# Patient Record
Sex: FEMALE | Race: ASIAN | NOT HISPANIC OR LATINO | URBAN - METROPOLITAN AREA
[De-identification: names, ages, dates, MRNs, and addresses within clinical notes are randomized per-mention and may not be internally consistent; named-entity substitution may affect disease eponyms.]

---

## 2018-08-26 ENCOUNTER — EMERGENCY (EMERGENCY)
Facility: HOSPITAL | Age: 53
LOS: 1 days | Discharge: HOME | End: 2018-08-26
Attending: EMERGENCY MEDICINE | Admitting: EMERGENCY MEDICINE
Payer: MEDICAID

## 2018-08-26 VITALS
OXYGEN SATURATION: 100 % | HEART RATE: 84 BPM | TEMPERATURE: 99 F | DIASTOLIC BLOOD PRESSURE: 64 MMHG | RESPIRATION RATE: 18 BRPM | SYSTOLIC BLOOD PRESSURE: 136 MMHG

## 2018-08-26 LAB
ALBUMIN SERPL ELPH-MCNC: 4.1 G/DL — SIGNIFICANT CHANGE UP (ref 3.3–5)
ALP SERPL-CCNC: 95 U/L — SIGNIFICANT CHANGE UP (ref 40–120)
ALT FLD-CCNC: 10 U/L — SIGNIFICANT CHANGE UP (ref 4–33)
ANISOCYTOSIS BLD QL: SLIGHT — SIGNIFICANT CHANGE UP
AST SERPL-CCNC: 16 U/L — SIGNIFICANT CHANGE UP (ref 4–32)
BASOPHILS # BLD AUTO: 0.01 K/UL — SIGNIFICANT CHANGE UP (ref 0–0.2)
BASOPHILS NFR BLD AUTO: 0.2 % — SIGNIFICANT CHANGE UP (ref 0–2)
BILIRUB SERPL-MCNC: < 0.2 MG/DL — LOW (ref 0.2–1.2)
BLD GP AB SCN SERPL QL: POSITIVE — SIGNIFICANT CHANGE UP
BUN SERPL-MCNC: 11 MG/DL — SIGNIFICANT CHANGE UP (ref 7–23)
CALCIUM SERPL-MCNC: 8.7 MG/DL — SIGNIFICANT CHANGE UP (ref 8.4–10.5)
CHLORIDE SERPL-SCNC: 105 MMOL/L — SIGNIFICANT CHANGE UP (ref 98–107)
CO2 SERPL-SCNC: 23 MMOL/L — SIGNIFICANT CHANGE UP (ref 22–31)
CREAT SERPL-MCNC: 0.75 MG/DL — SIGNIFICANT CHANGE UP (ref 0.5–1.3)
EOSINOPHIL # BLD AUTO: 0.25 K/UL — SIGNIFICANT CHANGE UP (ref 0–0.5)
EOSINOPHIL NFR BLD AUTO: 4.2 % — SIGNIFICANT CHANGE UP (ref 0–6)
FERRITIN SERPL-MCNC: < 5 NG/ML — LOW (ref 15–150)
GLUCOSE SERPL-MCNC: 95 MG/DL — SIGNIFICANT CHANGE UP (ref 70–99)
HCT VFR BLD CALC: 15 % — CRITICAL LOW (ref 34.5–45)
HGB BLD-MCNC: 3.7 G/DL — CRITICAL LOW (ref 11.5–15.5)
HYPOCHROMIA BLD QL: SIGNIFICANT CHANGE UP
IMM GRANULOCYTES # BLD AUTO: 0.02 # — SIGNIFICANT CHANGE UP
IMM GRANULOCYTES NFR BLD AUTO: 0.3 % — SIGNIFICANT CHANGE UP (ref 0–1.5)
IRON SATN MFR SERPL: 12 UG/DL — LOW (ref 30–160)
IRON SATN MFR SERPL: 355 UG/DL — SIGNIFICANT CHANGE UP (ref 140–530)
LG PLATELETS BLD QL AUTO: SLIGHT — SIGNIFICANT CHANGE UP
LYMPHOCYTES # BLD AUTO: 1.15 K/UL — SIGNIFICANT CHANGE UP (ref 1–3.3)
LYMPHOCYTES # BLD AUTO: 19.5 % — SIGNIFICANT CHANGE UP (ref 13–44)
MCHC RBC-ENTMCNC: 15.2 PG — LOW (ref 27–34)
MCHC RBC-ENTMCNC: 24.7 % — LOW (ref 32–36)
MCV RBC AUTO: 61.5 FL — LOW (ref 80–100)
MICROCYTES BLD QL: SLIGHT — SIGNIFICANT CHANGE UP
MONOCYTES # BLD AUTO: 0.55 K/UL — SIGNIFICANT CHANGE UP (ref 0–0.9)
MONOCYTES NFR BLD AUTO: 9.3 % — SIGNIFICANT CHANGE UP (ref 2–14)
NEUTROPHILS # BLD AUTO: 3.91 K/UL — SIGNIFICANT CHANGE UP (ref 1.8–7.4)
NEUTROPHILS NFR BLD AUTO: 66.5 % — SIGNIFICANT CHANGE UP (ref 43–77)
NRBC # FLD: 0 — SIGNIFICANT CHANGE UP
OB PNL STL: NEGATIVE — SIGNIFICANT CHANGE UP
OVALOCYTES BLD QL SMEAR: SLIGHT — SIGNIFICANT CHANGE UP
PLATELET # BLD AUTO: 413 K/UL — HIGH (ref 150–400)
PLATELET COUNT - ESTIMATE: NORMAL — SIGNIFICANT CHANGE UP
PMV BLD: SIGNIFICANT CHANGE UP FL (ref 7–13)
POTASSIUM SERPL-MCNC: 3.8 MMOL/L — SIGNIFICANT CHANGE UP (ref 3.5–5.3)
POTASSIUM SERPL-SCNC: 3.8 MMOL/L — SIGNIFICANT CHANGE UP (ref 3.5–5.3)
PROT SERPL-MCNC: 7.2 G/DL — SIGNIFICANT CHANGE UP (ref 6–8.3)
RBC # BLD: 2.44 M/UL — LOW (ref 3.8–5.2)
RBC # FLD: SIGNIFICANT CHANGE UP % (ref 10.3–14.5)
REVIEW TO FOLLOW: YES — SIGNIFICANT CHANGE UP
RH IG SCN BLD-IMP: POSITIVE — SIGNIFICANT CHANGE UP
RH IG SCN BLD-IMP: POSITIVE — SIGNIFICANT CHANGE UP
SODIUM SERPL-SCNC: 142 MMOL/L — SIGNIFICANT CHANGE UP (ref 135–145)
UIBC SERPL-MCNC: 343.4 UG/DL — SIGNIFICANT CHANGE UP (ref 110–370)
WBC # BLD: 5.89 K/UL — SIGNIFICANT CHANGE UP (ref 3.8–10.5)
WBC # FLD AUTO: 5.89 K/UL — SIGNIFICANT CHANGE UP (ref 3.8–10.5)

## 2018-08-26 PROCEDURE — 86077 PHYS BLOOD BANK SERV XMATCH: CPT

## 2018-08-26 PROCEDURE — 99218: CPT

## 2018-08-26 NOTE — ED ADULT NURSE NOTE - NSIMPLEMENTINTERV_GEN_ALL_ED
Implemented All Universal Safety Interventions:  [place] to call system. Call bell, personal items and telephone within reach. Instruct patient to call for assistance. Room bathroom lighting operational. Non-slip footwear when patient is off stretcher. Physically safe environment: no spills, clutter or unnecessary equipment. Stretcher in lowest position, wheels locked, appropriate side rails in place.

## 2018-08-26 NOTE — ED CDU PROVIDER INITIAL DAY NOTE - DETAILS
51 y/o with abnormal uterine bleeding c/o fatigue and BERKOWITZ found to have Hgb of 3.7. Placed in CDU fro blood trandfusion

## 2018-08-26 NOTE — ED ADULT NURSE NOTE - OBJECTIVE STATEMENT
received pt in bed A and O x 3 in NAD,  with pale skin, pt reports  " My doctor told me my H and H was low sow he told me to come here ". denies pain, reports " burning " with bowel movements. denies hx of blood transfusion.  IV initialed labs sent. pending MD rocha.

## 2018-08-26 NOTE — ED PROVIDER NOTE - MEDICAL DECISION MAKING DETAILS
61yo woman with profound anemia, Hgb 3 from office, CBC at 3.7 here. Hemodynamically stable (vitals wnl), symptomatic. Not menstruating. Patient has h/o long, heavy periods. R/o GIB, likely AUB. [place], routine labs. 2U PRBCs, reassess.

## 2018-08-26 NOTE — ED PROVIDER NOTE - OBJECTIVE STATEMENT
51yo woman w history of prolonged, heavy periods presents with 3-4 months of fatigue, dyspnea and palpitations on exertion, sent from gyn with Hgb of 3. LMP 8/12, no active bleeding. No melena or BRBPR. No hematemesis. Patient denies lightheadedness, chest pain, orthostatic symptoms. Infrequent healthcare visits (gyn 10 years ago for IUD insertion, last month for IUD removal, heavy periods for duration of IUD).     (Julian): Ebonie #[id number]

## 2018-08-26 NOTE — ED CDU PROVIDER INITIAL DAY NOTE - OBJECTIVE STATEMENT
51 y/o with abnormal uterine bleeding c/o fatigue and BERKOWITZ found to have Hgb of 3.7. Placed in CDU fro blood trandfusion. Denies cp, GI bleeding, Vaginal bleeding at present

## 2018-08-26 NOTE — ED ADULT NURSE REASSESSMENT NOTE - NS ED NURSE REASSESS COMMENT FT1
Spoke with  Dori for blood bank pt has positive antibody. pt continues to deny blood transfusions in the past.. states had 4 children.

## 2018-08-26 NOTE — ED PROVIDER NOTE - GASTROINTESTINAL, MLM
Abdomen soft, non-tender, no guarding. Abdomen soft, non-tender, no guarding. ZE: external hemorrhoids, no BRBPR

## 2018-08-26 NOTE — ED CDU PROVIDER INITIAL DAY NOTE - ATTENDING CONTRIBUTION TO CARE
I agree with the above H&P.  Briefly this is a 52 year old female with DUB and hg 3.7.  no active bleeding HD stable. will tranfuse and monitor for response

## 2018-08-26 NOTE — ED PROVIDER NOTE - PROGRESS NOTE DETAILS
Patient counseled on risks and benefits of blood transfusion with Cantonese , expressed understanding and was given opportunity to ask questions. She consented to a blood transfusion, 2nd type and screen pending, heme-occult fecal test pending.

## 2018-08-26 NOTE — ED PROVIDER NOTE - ATTENDING CONTRIBUTION TO CARE
Seen and examined, hx of vaginal bleeding with no active bleeding at time of eval, c/o recent inc. in fatigue, SOB, palpitations, had outpt. labs and called to go to ED due to very low Hgb. NAD at rest, anicteric, clear lungs, heart reg, no murmur, soft abd, NT to palp, no edema, no rash, no petechiae.

## 2018-08-26 NOTE — ED PROVIDER NOTE - CONSTITUTIONAL, MLM
normal... Apparently fatigued, oriented to person, place, time/situation and in no apparent distress.

## 2018-08-26 NOTE — ED ADULT TRIAGE NOTE - CHIEF COMPLAINT QUOTE
C/o of hemoglobin of 3 as per MD blood test result.  Pt states :"  I feel fine just weak and I cant walk a lot. My doc called and said to come to ER." No medical hx.

## 2018-08-27 VITALS
HEART RATE: 66 BPM | RESPIRATION RATE: 18 BRPM | TEMPERATURE: 98 F | SYSTOLIC BLOOD PRESSURE: 104 MMHG | OXYGEN SATURATION: 100 % | DIASTOLIC BLOOD PRESSURE: 59 MMHG

## 2018-08-27 LAB
HCT VFR BLD CALC: 26.1 % — LOW (ref 34.5–45)
HGB BLD-MCNC: 7.5 G/DL — LOW (ref 11.5–15.5)
MCHC RBC-ENTMCNC: 20.4 PG — LOW (ref 27–34)
MCHC RBC-ENTMCNC: 28.7 % — LOW (ref 32–36)
MCV RBC AUTO: 71.1 FL — LOW (ref 80–100)
NRBC # FLD: 0 — SIGNIFICANT CHANGE UP
PLATELET # BLD AUTO: 341 K/UL — SIGNIFICANT CHANGE UP (ref 150–400)
PMV BLD: 9 FL — SIGNIFICANT CHANGE UP (ref 7–13)
RBC # BLD: 3.67 M/UL — LOW (ref 3.8–5.2)
RBC # FLD: 25.2 % — HIGH (ref 10.3–14.5)
TRANSFERRIN SERPL-MCNC: 282 MG/DL — SIGNIFICANT CHANGE UP (ref 200–360)
WBC # BLD: 6.9 K/UL — SIGNIFICANT CHANGE UP (ref 3.8–10.5)
WBC # FLD AUTO: 6.9 K/UL — SIGNIFICANT CHANGE UP (ref 3.8–10.5)

## 2018-08-27 PROCEDURE — 99217: CPT

## 2018-08-27 RX ORDER — ASCORBIC ACID 60 MG
1 TABLET,CHEWABLE ORAL
Qty: 30 | Refills: 0 | OUTPATIENT
Start: 2018-08-27 | End: 2018-09-25

## 2018-08-27 RX ORDER — FERROUS SULFATE 325(65) MG
1 TABLET ORAL
Qty: 30 | Refills: 0 | OUTPATIENT
Start: 2018-08-27 | End: 2018-09-25

## 2018-08-27 RX ORDER — ACETAMINOPHEN 500 MG
650 TABLET ORAL ONCE
Qty: 0 | Refills: 0 | Status: DISCONTINUED | OUTPATIENT
Start: 2018-08-27 | End: 2018-08-27

## 2018-08-27 NOTE — ED CDU PROVIDER DISPOSITION NOTE - ATTENDING CONTRIBUTION TO CARE
Attending Note (Nicolas): I have reviewed the testing and notes of this patient placed in the Clinical Decision Unit.  I agree with the notes of the advanced practice clinician. This patient has been observed for a period of time.  During this time, there are no objective signs of significant acute pathology.  The patient understand the need for follow-up.  The patient is stable for discharge to continue their evaluation and treatment as an outpatient.

## 2018-08-27 NOTE — ED CDU PROVIDER DISPOSITION NOTE - PLAN OF CARE
Rest, drink plenty of fluids.  Advance activity as tolerated. Take Iron supplement daily as directed. Follow up with your primary care physician and GYN in 48-72 hours- bring copies of your results.  Return to the ER for worsening or persistent symptoms, and/or ANY NEW OR CONCERNING SYMPTOMS. If you have issues obtaining follow up, please call: [phone] to obtain a doctor or specialist who takes your insurance in your area.

## 2018-08-27 NOTE — ED CDU PROVIDER SUBSEQUENT DAY NOTE - ATTENDING CONTRIBUTION TO CARE
Attending Note (Nicolas): patient in cdu for transfusion and repeat cbc. had 3 units without difficulty. ambulating without dyspnea or fatigue after. no active bleeding.

## 2018-08-27 NOTE — ED CDU PROVIDER SUBSEQUENT DAY NOTE - HISTORY
53 y/o with abnormal uterine bleeding c/o fatigue and BERKOWITZ found to have Hgb of 3.7. Placed in CDU fro blood transfusion. Denies cp, GI bleeding, Vaginal bleeding at present

## 2018-08-27 NOTE — ED CDU PROVIDER SUBSEQUENT DAY NOTE - MEDICAL DECISION MAKING DETAILS
51 y/o with abnormal uterine bleeding c/o fatigue and BERKOWITZ found to have Hgb of 3.7. Placed in CDU fro blood trandfusion. Denies cp, GI bleeding, NO Vaginal bleeding at present

## 2018-08-27 NOTE — ED CDU PROVIDER SUBSEQUENT DAY NOTE - PROGRESS NOTE DETAILS
pt doing well, PRBC running, will repeat CBC. No active bleeding in CDU Patient laying comfortably in bed NAD, No complaints. Pt states feeling better. Receiving 3rd PRBC blood transfusion. No adverse effects noted. Will continue to monitor and repeat CBC after transfusion.

## 2018-08-28 LAB
HGB A MFR BLD: 97.5 % — SIGNIFICANT CHANGE UP
HGB A2 MFR BLD: 2.2 % — LOW (ref 2.4–3.5)
HGB ELECT COMMENT: SIGNIFICANT CHANGE UP
HGB F MFR BLD: < 1 % — SIGNIFICANT CHANGE UP (ref 0–1.5)

## 2021-09-22 NOTE — ED CDU PROVIDER SUBSEQUENT DAY NOTE - CPE EDP CARDIAC NORM
Kannan Taveras is calling to request a refill on the following medication(s):    Last Visit Date (If Applicable):  3/2/6446    Next Visit Date:    Visit date not found    Medication Request:  Requested Prescriptions     Pending Prescriptions Disp Refills    albuterol sulfate  (90 Base) MCG/ACT inhaler [Pharmacy Med Name: ALBUTEROL HFA 90 MCG INHALER] 18 g      Sig: INHALE 2 PUFFS BY MOUTH 4 TIMES DAILY IF NEEDED,
normal...

## 2023-09-03 NOTE — ED PROVIDER NOTE - EYES NEGATIVE STATEMENT, MLM
(Not in a hospital admission)      Review of patient's allergies indicates:  No Known Allergies    No past medical history on file.  No past surgical history on file.  Family History    None       Tobacco Use    Smoking status: Not on file    Smokeless tobacco: Not on file   Substance and Sexual Activity    Alcohol use: Not on file    Drug use: Not on file    Sexual activity: Not on file     Review of Systems   Constitutional:  Positive for fever (subjective, no value >100). Negative for chills and fatigue.   Respiratory:  Negative for shortness of breath.    Cardiovascular:  Negative for chest pain.   Gastrointestinal:  Positive for abdominal pain (lower abdomen).   Skin:  Negative for color change and wound.   Neurological:  Positive for headaches.   All other systems reviewed and are negative.    Objective:     Vital Signs (Most Recent):  Temp: 98.1 °F (36.7 °C) (09/03/23 0800)  Pulse: 87 (09/03/23 0732)  Resp: 18 (09/03/23 0732)  BP: 112/68 (09/03/23 0732)  SpO2: 99 % (09/03/23 0732) Vital Signs (24h Range):  Temp:  [98.1 °F (36.7 °C)-99.6 °F (37.6 °C)] 98.1 °F (36.7 °C)  Pulse:  [] 87  Resp:  [13-20] 18  SpO2:  [94 %-100 %] 99 %  BP: (109-130)/(60-84) 112/68     Weight: 72.6 kg (160 lb)  Body mass index is 33.44 kg/m².      Physical Exam  Constitutional:       Appearance: Normal appearance.   HENT:      Head: Normocephalic and atraumatic.   Cardiovascular:      Rate and Rhythm: Normal rate and regular rhythm.   Pulmonary:      Effort: Pulmonary effort is normal.   Abdominal:      General: Abdomen is flat.      Tenderness: There is no abdominal tenderness.   Musculoskeletal:         General: No swelling.      Left lower leg: No edema.   Skin:     General: Skin is warm.      Capillary Refill: Capillary refill takes less than 2 seconds.   Neurological:      General: No focal deficit present.      Mental Status: She is alert and oriented to person, place, and time.          Significant Labs:  Recent Lab  Results  (Last 5 results in the past 24 hours)        09/03/23  0841   09/03/23  0504   09/02/23  1950   09/02/23  1945   09/02/23  1942        Appearance, UA       Hazy         Bacteria, UA       Few         Bilirubin (UA)       Negative         Color, UA       Yellow         Estimated Avg Glucose   85             Glucose, UA       Negative         Hemoglobin A1C External   4.6  Comment: ADA Screening Guidelines:  5.7-6.4%  Consistent with prediabetes  >or=6.5%  Consistent with diabetes    High levels of fetal hemoglobin interfere with the HbA1C  assay. Heterozygous hemoglobin variants (HbS, HgC, etc)do  not significantly interfere with this assay.   However, presence of multiple variants may affect accuracy.               INR 1.3  Comment: Coumadin Therapy:  2.0 - 3.0 for INR for all indicators except mechanical heart valves  and antiphospholipid syndromes which should use 2.5 - 3.5.                 Ketones, UA       Negative         Leukocytes, UA       Trace         Microscopic Comment       SEE COMMENT  Comment: Other formed elements not mentioned in the report are not   present in the microscopic examination.            NITRITE UA       Negative         Occult Blood UA       Negative         pH, UA       6.0         Preg Test, Ur     Negative           Protein, UA       Negative  Comment: Recommend a 24 hour urine protein or a urine   protein/creatinine ratio if globulin induced proteinuria is  clinically suspected.           Protime 13.8                Acceptable     Yes           RBC, UA       1         SARS-CoV-2 RNA, Amplification, Qual         Negative  Comment: This test utilizes isothermal nucleic acid amplification technology   to   detect the SARS-CoV-2 RdRp nucleic acid segment. The analytical   sensitivity   (limit of detection) is 500 copies/swab.     A POSITIVE result is indicative of the presence of SARS-CoV-2 RNA;   clinical   correlation with patient history and other diagnostic  information is   necessary to determine patient infection status.    A NEGATIVE result means that SARS-CoV-2 nucleic acids are not present   above   the limit of detection. A NEGATIVE result should be treated as   presumptive.   It does not rule out the possibility of COVID-19 and should not be   the sole   basis for treatment decisions. If COVID-19 is strongly suspected   based on   clinical and exposure history, re-testing using an alternate   molecular assay   should be considered.     This test is only for use under the Food and Drug Administration s   Emergency   Use Authorization (EUA).     Commercial kits are provided by [employer]. Performance   characteristics of the EUA have been independently verified by   [facility] Department of Pathology and Laboratory Medicine.   _________________________________________________________________   The authorized Fact Sheet for Healthcare Providers and the authorized   Fact   Sheet for Patients of the ID NOW COVID-19 are available on the FDA   website:   https://www.fda.gov/media/589492/download   https://www.fda.gov/media/897638/download         Specific Gravity, UA       1.020         Specimen UA       Urine, Clean Catch         Squam Epithel, UA       14         Troponin I   <0.006  Comment: The reference interval for Troponin I represents the 99th percentile   cutoff   for our facility and is consistent with 3rd generation assay   performance.               TSH   1.530             WBC, UA       7                                Significant Diagnostics:  I have reviewed all pertinent imaging results/findings within the past 24 hours.   no discharge, no irritation, no pain, no redness, and no visual changes.